# Patient Record
Sex: MALE | Race: WHITE | NOT HISPANIC OR LATINO | Employment: UNEMPLOYED | URBAN - METROPOLITAN AREA
[De-identification: names, ages, dates, MRNs, and addresses within clinical notes are randomized per-mention and may not be internally consistent; named-entity substitution may affect disease eponyms.]

---

## 2017-07-17 DIAGNOSIS — M54.10 RADICULOPATHY, SITE UNSPECIFIED: ICD-10-CM

## 2017-07-17 DIAGNOSIS — M54.2 CERVICALGIA: Primary | ICD-10-CM

## 2017-07-19 ENCOUNTER — HOSPITAL ENCOUNTER (OUTPATIENT)
Dept: RADIOLOGY | Facility: HOSPITAL | Age: 42
Discharge: HOME | End: 2017-07-19
Attending: RADIOLOGY
Payer: MEDICAID

## 2017-07-19 ENCOUNTER — HOSPITAL ENCOUNTER (OUTPATIENT)
Dept: RADIOLOGY | Facility: HOSPITAL | Age: 42
Discharge: HOME | End: 2017-07-19
Attending: NURSE PRACTITIONER
Payer: MEDICAID

## 2017-07-19 DIAGNOSIS — M54.2 CERVICALGIA: ICD-10-CM

## 2017-07-19 DIAGNOSIS — M54.10 RADICULOPATHY, SITE UNSPECIFIED: ICD-10-CM

## 2017-07-19 PROCEDURE — 72141 MRI NECK SPINE W/O DYE: CPT | Mod: TC

## 2017-07-19 PROCEDURE — 72141 MRI NECK SPINE W/O DYE: CPT | Mod: 26,,, | Performed by: RADIOLOGY

## 2017-07-19 PROCEDURE — 70140 X-RAY EXAM OF FACIAL BONES: CPT | Mod: TC

## 2017-07-19 PROCEDURE — 70140 X-RAY EXAM OF FACIAL BONES: CPT | Mod: 26,,, | Performed by: RADIOLOGY

## 2017-10-20 ENCOUNTER — HOSPITAL ENCOUNTER (EMERGENCY)
Facility: HOSPITAL | Age: 42
Discharge: HOME | End: 2017-10-20
Attending: EMERGENCY MEDICINE
Payer: MEDICAID

## 2017-10-20 VITALS
RESPIRATION RATE: 16 BRPM | TEMPERATURE: 98 F | HEART RATE: 76 BPM | OXYGEN SATURATION: 95 % | WEIGHT: 220 LBS | BODY MASS INDEX: 29.03 KG/M2 | DIASTOLIC BLOOD PRESSURE: 71 MMHG | SYSTOLIC BLOOD PRESSURE: 125 MMHG

## 2017-10-20 DIAGNOSIS — S81.012A LACERATION OF LEFT KNEE, INITIAL ENCOUNTER: Primary | ICD-10-CM

## 2017-10-20 PROCEDURE — 99283 EMERGENCY DEPT VISIT LOW MDM: CPT | Mod: 25

## 2017-10-20 PROCEDURE — 90471 IMMUNIZATION ADMIN: CPT | Performed by: EMERGENCY MEDICINE

## 2017-10-20 PROCEDURE — 12032 INTMD RPR S/A/T/EXT 2.6-7.5: CPT | Mod: LT

## 2017-10-20 PROCEDURE — 63600175 PHARM REV CODE 636 W HCPCS: Performed by: EMERGENCY MEDICINE

## 2017-10-20 PROCEDURE — 25000003 PHARM REV CODE 250: Performed by: EMERGENCY MEDICINE

## 2017-10-20 PROCEDURE — 90715 TDAP VACCINE 7 YRS/> IM: CPT | Performed by: EMERGENCY MEDICINE

## 2017-10-20 RX ORDER — CEPHALEXIN 250 MG/1
500 CAPSULE ORAL
Status: COMPLETED | OUTPATIENT
Start: 2017-10-20 | End: 2017-10-20

## 2017-10-20 RX ORDER — LIDOCAINE HCL/EPINEPHRINE/PF 2%-1:200K
10 VIAL (ML) INJECTION ONCE
Status: COMPLETED | OUTPATIENT
Start: 2017-10-20 | End: 2017-10-20

## 2017-10-20 RX ORDER — CEPHALEXIN 500 MG/1
500 CAPSULE ORAL EVERY 8 HOURS
Qty: 21 CAPSULE | Refills: 0 | Status: SHIPPED | OUTPATIENT
Start: 2017-10-20 | End: 2017-10-27

## 2017-10-20 RX ORDER — DICLOFENAC SODIUM 50 MG/1
50 TABLET, DELAYED RELEASE ORAL 3 TIMES DAILY
Qty: 15 TABLET | Refills: 0 | Status: SHIPPED | OUTPATIENT
Start: 2017-10-20 | End: 2018-10-20

## 2017-10-20 RX ADMIN — CEPHALEXIN 500 MG: 250 CAPSULE ORAL at 01:10

## 2017-10-20 RX ADMIN — CLOSTRIDIUM TETANI TOXOID ANTIGEN (FORMALDEHYDE INACTIVATED), CORYNEBACTERIUM DIPHTHERIAE TOXOID ANTIGEN (FORMALDEHYDE INACTIVATED), BORDETELLA PERTUSSIS TOXOID ANTIGEN (GLUTARALDEHYDE INACTIVATED), BORDETELLA PERTUSSIS FILAMENTOUS HEMAGGLUTININ ANTIGEN (FORMALDEHYDE INACTIVATED), BORDETELLA PERTUSSIS PERTACTIN ANTIGEN, AND BORDETELLA PERTUSSIS FIMBRIAE 2/3 ANTIGEN 0.5 ML: 5; 2; 2.5; 5; 3; 5 INJECTION, SUSPENSION INTRAMUSCULAR at 12:10

## 2017-10-20 RX ADMIN — LIDOCAINE HYDROCHLORIDE,EPINEPHRINE BITARTRATE 10 ML: 20; .005 INJECTION, SOLUTION EPIDURAL; INFILTRATION; INTRACAUDAL; PERINEURAL at 12:10

## 2017-10-20 NOTE — ED PROVIDER NOTES
Encounter Date: 10/20/2017    SCRIBE #1 NOTE: I, Renuka Marks, am scribing for, and in the presence of, Dr. Lott.       History     Chief Complaint   Patient presents with    Laceration      wheel disintergrated and hit left leg       10/20/2017 12:48 PM     Chief complaint: Left Knee Laceration      Tomy Hylton is a 42 y.o. male who presents to the ED with a c/o laceration to the left knee with associated pain, shortly PTA. 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 Denies fever. NKDA. His tetanus immunization is not UTD. No pertinent medical or surgical history.      The history is provided by the patient.     Review of patient's allergies indicates:  No Known Allergies  History reviewed. No pertinent past medical history.  Past Surgical History:   Procedure Laterality Date    HERNIA REPAIR       History reviewed. No pertinent family history.  Social History   Substance Use Topics    Smoking status: Current Every Day Smoker     Packs/day: 0.50    Smokeless tobacco: Never Used    Alcohol use No     Review of Systems   Constitutional: Negative for activity change, appetite change, chills, fatigue and fever.   Eyes: Negative for visual disturbance.   Respiratory: Negative for apnea and shortness of breath.    Cardiovascular: Negative for chest pain and palpitations.   Gastrointestinal: Negative for abdominal distention and abdominal pain.   Genitourinary: Negative for difficulty urinating.   Musculoskeletal: Negative for neck pain.   Skin: Positive for wound (Laceration to left knee.). Negative for pallor and rash.   Neurological: Negative for headaches.   Hematological: Does not bruise/bleed easily.   Psychiatric/Behavioral: Negative for agitation.       Physical Exam     Initial Vitals [10/20/17 1242]   BP Pulse Resp Temp SpO2   125/71 76 16 98.4 °F (36.9 °C) 95 %      MAP       89         Physical Exam    Nursing note and vitals  reviewed.  Constitutional: He appears well-developed and well-nourished.   HENT:   Head: Normocephalic and atraumatic.   Eyes: Conjunctivae are normal.   Neck: Normal range of motion. Neck supple.   Cardiovascular: Normal rate, regular rhythm and normal heart sounds. Exam reveals no gallop and no friction rub.    No murmur heard.  Pulmonary/Chest: Breath sounds normal. No respiratory distress. He has no wheezes. He has no rhonchi. He has no rales.   Abdominal: Soft. He exhibits no distension. There is no tenderness. There is no rebound.   Musculoskeletal: Normal range of motion.   5 cm horizontal laceration of the left knee with small amounts of foreign bodies. No effusion. Does not enter the joint capsule.    Neurological: He is alert and oriented to person, place, and time.   Skin: Skin is warm and dry.   Psychiatric: He has a normal mood and affect.         ED Course   Lac Repair  Date/Time: 10/20/2017 1:31 PM  Performed by: KARLA CHA III  Authorized by: KARLA CHA III   Body area: lower extremity  Laceration length: 5 cm  Contamination: The wound is contaminated.  Tendon involvement: none  Nerve involvement: none  Vascular damage: no  Anesthesia: local infiltration    Anesthesia:  Local Anesthetic: lidocaine 2% with epinephrine  Anesthetic total: 5 mL  Preparation: Patient was prepped and draped in the usual sterile fashion.  Irrigation method: jet lavage  Amount of cleaning: extensive  Debridement: none  Degree of undermining: none  Skin closure: staples  Technique: simple  Approximation: close  Approximation difficulty: simple  Patient tolerance: Patient tolerated the procedure well with no immediate complications        Labs Reviewed - No data to display          Medical Decision Making:   History:   Old Medical Records: I decided to obtain old medical records.  ED Management:  Tomy Hylton is a 42 y.o. male who presents with  left knee laceration with no evidence of tendon or nerve  involvement.  The wound is extensively irrigated and closed with staples.  He is empirically treated with Keflex and encouraged to return immediately for swelling, erythema or drainage.  Staples are to be removed in 10 days.            Scribe Attestation:   Scribe #1: I performed the above scribed service and the documentation accurately describes the services I performed. I attest to the accuracy of the note.            ED Course      Clinical Impression:   No diagnosis found.                             Yonis Lott III, MD  10/20/17 9930

## 2017-11-07 ENCOUNTER — HOSPITAL ENCOUNTER (EMERGENCY)
Facility: HOSPITAL | Age: 42
Discharge: HOME | End: 2017-11-07
Attending: EMERGENCY MEDICINE
Payer: MEDICAID

## 2017-11-07 VITALS
HEIGHT: 73 IN | HEART RATE: 87 BPM | BODY MASS INDEX: 29.16 KG/M2 | RESPIRATION RATE: 16 BRPM | DIASTOLIC BLOOD PRESSURE: 74 MMHG | OXYGEN SATURATION: 98 % | TEMPERATURE: 97 F | SYSTOLIC BLOOD PRESSURE: 160 MMHG | WEIGHT: 220 LBS

## 2017-11-07 DIAGNOSIS — Z51.89 ENCOUNTER FOR POST-TRAUMATIC WOUND CHECK: Primary | ICD-10-CM

## 2017-11-07 PROCEDURE — 99281 EMR DPT VST MAYX REQ PHY/QHP: CPT

## 2017-11-07 NOTE — ED NOTES
Pt was seen here 10 days ago for laceration to left knee. 7 staples in place. Wound appears to be well healed. No redness or drainage noted. 7 staples removed. Tolerated well.

## 2017-11-07 NOTE — ED PROVIDER NOTES
Encounter Date: 11/7/2017    SCRIBE #1 NOTE: Taylor MIRANDA, juanita scribing for, and in the presence of, Helena Savage NP.       History     Chief Complaint   Patient presents with    Suture / Staple Removal     left knee       11/07/2017 2:10 PM     Chief complaint:       Tomy Hylton is a 42 y.o. male who presents to the ED for follow up for a left knee laceration. He was seen at [facility] on 10/20/2017 where he received 7 staples to his left knee. The patient denies pain, fever, and drainage to the site. He was treated empirically with keflex, which he completed. His Tetanus shot was updated on 10/20/2017.       The history is provided by the patient.     Review of patient's allergies indicates:  No Known Allergies  No past medical history on file.  Past Surgical History:   Procedure Laterality Date    HERNIA REPAIR       History reviewed. No pertinent family history.  Social History   Substance Use Topics    Smoking status: Current Every Day Smoker     Packs/day: 0.50    Smokeless tobacco: Never Used    Alcohol use No     Review of Systems   Constitutional: Negative for chills and fever.   Musculoskeletal: Negative for joint swelling.   Skin: Positive for wound ( left knee).       Physical Exam     Initial Vitals [11/07/17 1408]   BP Pulse Resp Temp SpO2   (!) 160/74 87 16 97.4 °F (36.3 °C) 98 %      MAP       102.67         Physical Exam    Nursing note and vitals reviewed.  Constitutional: Vital signs are normal. He appears well-developed and well-nourished.   HENT:   Head: Normocephalic and atraumatic.   Eyes: Pupils are equal, round, and reactive to light.   Neck: Neck supple.   Cardiovascular: Normal rate, regular rhythm, normal heart sounds and intact distal pulses.   No murmur heard.  Pulmonary/Chest: Breath sounds normal.   Abdominal: Normal appearance.   Musculoskeletal: Normal range of motion. He exhibits no edema or tenderness.   Normal left knee ROM   Neurological: He is alert and  oriented to person, place, and time.   Skin: Skin is warm, dry and intact.   Incision to the left knee approximately 5cm. Well approximated. No draining, erythema, or swelling.   Psychiatric: He has a normal mood and affect. His speech is normal and behavior is normal.         ED Course   Procedures  Labs Reviewed - No data to display          Medical Decision Making:   History:   Old Medical Records: I decided to obtain old medical records.  Differential Diagnosis:   Cellulitis  Wound infection/dehissince        APC / Resident Notes:   Patient is a 42 y.o. male who presents to the ED 11/07/2017 with a chief complaint of wound check. Patient had laceration repair to left knee with staples on 10/20/2017. Patient completed course of keflex. He is afebrile and well appearing. His left knee is not swollen or tender without erythema; 5 cm horizontal laceration well approximated without drainage; staples removed; patient tolerated well. No signs of infection; patient has normal ROM of left knee and is ambulatory; do not feel patient has acute infection; no further imaging or labs needed at this time; patient discharged home.             Scribe Attestation:   Scribe #1: I performed the above scribed service and the documentation accurately describes the services I performed. I attest to the accuracy of the note.    Attending Attestation:     Physician Attestation Statement for NP/PA:   I discussed this assessment and plan of this patient with the NP/PA, but I did not personally examine the patient. The face to face encounter was performed by the NP/PA.    Other NP/PA Attestation Additions:    History of Present Illness: 42-year-old male presented for staple removal.    Medical Decision Making: Initial differential diagnosis included but not limited to healing wound and infected wound.  I am in agreement with the nurse practitioner's assessment, treatment, and plan of care.       Physician Attestation for Scribe:  Physician  Attestation Statement for Scribe #1: I, Helena Savage, reviewed documentation, as scribed by in my presence, and it is both accurate and complete.     Comments: I, JENNIFER Han, personally performed the services described in this documentation. All medical record entries made by the scribe were at my direction and in my presence.  I have reviewed the chart and agree that the record reflects my personal performance and is accurate and complete. JENNIFER Han.  11:16 PM 11/07/2017             ED Course      Clinical Impression:   The encounter diagnosis was Encounter for post-traumatic wound check.    Disposition:   Disposition: Discharged  Condition: Stable                        Helena Savage NP  11/07/17 3622       Perez Glover MD  11/10/17 0822

## 2017-11-07 NOTE — ED PROVIDER NOTES
Encounter Date: 11/7/2017       History     Chief Complaint   Patient presents with    Suture / Staple Removal     left knee     HPI  Review of patient's allergies indicates:  No Known Allergies  No past medical history on file.  Past Surgical History:   Procedure Laterality Date    HERNIA REPAIR       History reviewed. No pertinent family history.  Social History   Substance Use Topics    Smoking status: Current Every Day Smoker     Packs/day: 0.50    Smokeless tobacco: Never Used    Alcohol use No     Review of Systems    Physical Exam     Initial Vitals [11/07/17 1408]   BP Pulse Resp Temp SpO2   (!) 160/74 87 16 97.4 °F (36.3 °C) 98 %      MAP       102.67         Physical Exam    ED Course   Procedures  Labs Reviewed - No data to display                            ED Course      Clinical Impression:   {Add your Clinical Impression here. If you haven't documented one yet, please pend the note, finalize a Clinical Impression, and refresh your note before signing.:[id number]}

## 2019-12-13 ENCOUNTER — HOSPITAL ENCOUNTER (EMERGENCY)
Facility: HOSPITAL | Age: 44
Discharge: HOME | End: 2019-12-13
Attending: EMERGENCY MEDICINE
Payer: MEDICAID

## 2019-12-13 VITALS
OXYGEN SATURATION: 95 % | RESPIRATION RATE: 18 BRPM | BODY MASS INDEX: 30.39 KG/M2 | HEART RATE: 58 BPM | SYSTOLIC BLOOD PRESSURE: 114 MMHG | TEMPERATURE: 98 F | WEIGHT: 236.81 LBS | HEIGHT: 74 IN | DIASTOLIC BLOOD PRESSURE: 56 MMHG

## 2019-12-13 DIAGNOSIS — K52.9 GASTROENTERITIS: ICD-10-CM

## 2019-12-13 DIAGNOSIS — R10.9 ABDOMINAL PAIN, UNSPECIFIED ABDOMINAL LOCATION: Primary | ICD-10-CM

## 2019-12-13 LAB
ALBUMIN SERPL BCP-MCNC: 4.5 G/DL (ref 3.5–5.2)
ALP SERPL-CCNC: 74 U/L (ref 55–135)
ALT SERPL W/O P-5'-P-CCNC: 57 U/L (ref 10–44)
ANION GAP SERPL CALC-SCNC: 8 MMOL/L (ref 8–16)
AST SERPL-CCNC: 26 U/L (ref 10–40)
BASOPHILS # BLD AUTO: 0.12 K/UL (ref 0–0.2)
BASOPHILS NFR BLD: 1.3 % (ref 0–1.9)
BILIRUB SERPL-MCNC: 1.6 MG/DL (ref 0.1–1)
BILIRUB UR QL STRIP: NEGATIVE
BUN SERPL-MCNC: 14 MG/DL (ref 6–20)
CALCIUM SERPL-MCNC: 9.5 MG/DL (ref 8.7–10.5)
CHLORIDE SERPL-SCNC: 103 MMOL/L (ref 95–110)
CLARITY UR: CLEAR
CO2 SERPL-SCNC: 27 MMOL/L (ref 23–29)
COLOR UR: YELLOW
CREAT SERPL-MCNC: 1 MG/DL (ref 0.5–1.4)
DIFFERENTIAL METHOD: ABNORMAL
EOSINOPHIL # BLD AUTO: 0.2 K/UL (ref 0–0.5)
EOSINOPHIL NFR BLD: 2.1 % (ref 0–8)
ERYTHROCYTE [DISTWIDTH] IN BLOOD BY AUTOMATED COUNT: 13.4 % (ref 11.5–14.5)
EST. GFR  (AFRICAN AMERICAN): >60 ML/MIN/1.73 M^2
EST. GFR  (NON AFRICAN AMERICAN): >60 ML/MIN/1.73 M^2
GLUCOSE SERPL-MCNC: 109 MG/DL (ref 70–110)
GLUCOSE UR QL STRIP: NEGATIVE
HCT VFR BLD AUTO: 51.9 % (ref 40–54)
HGB BLD-MCNC: 17.2 G/DL (ref 14–18)
HGB UR QL STRIP: NEGATIVE
IMM GRANULOCYTES # BLD AUTO: 0.1 K/UL (ref 0–0.04)
IMM GRANULOCYTES NFR BLD AUTO: 1.1 % (ref 0–0.5)
KETONES UR QL STRIP: NEGATIVE
LEUKOCYTE ESTERASE UR QL STRIP: NEGATIVE
LIPASE SERPL-CCNC: 28 U/L (ref 4–60)
LYMPHOCYTES # BLD AUTO: 2.2 K/UL (ref 1–4.8)
LYMPHOCYTES NFR BLD: 22.7 % (ref 18–48)
MCH RBC QN AUTO: 30.2 PG (ref 27–31)
MCHC RBC AUTO-ENTMCNC: 33.1 G/DL (ref 32–36)
MCV RBC AUTO: 91 FL (ref 82–98)
MONOCYTES # BLD AUTO: 1.1 K/UL (ref 0.3–1)
MONOCYTES NFR BLD: 11.1 % (ref 4–15)
NEUTROPHILS # BLD AUTO: 5.9 K/UL (ref 1.8–7.7)
NEUTROPHILS NFR BLD: 61.7 % (ref 38–73)
NITRITE UR QL STRIP: NEGATIVE
NRBC BLD-RTO: 0 /100 WBC
PH UR STRIP: 7 [PH] (ref 5–8)
PLATELET # BLD AUTO: 249 K/UL (ref 150–350)
PMV BLD AUTO: 10.5 FL (ref 9.2–12.9)
POTASSIUM SERPL-SCNC: 4.4 MMOL/L (ref 3.5–5.1)
PROT SERPL-MCNC: 7.6 G/DL (ref 6–8.4)
PROT UR QL STRIP: ABNORMAL
RBC # BLD AUTO: 5.7 M/UL (ref 4.6–6.2)
SODIUM SERPL-SCNC: 138 MMOL/L (ref 136–145)
SP GR UR STRIP: >=1.03 (ref 1–1.03)
URN SPEC COLLECT METH UR: ABNORMAL
UROBILINOGEN UR STRIP-ACNC: NEGATIVE EU/DL
WBC # BLD AUTO: 9.51 K/UL (ref 3.9–12.7)

## 2019-12-13 PROCEDURE — 25000003 PHARM REV CODE 250: Performed by: EMERGENCY MEDICINE

## 2019-12-13 PROCEDURE — 96365 THER/PROPH/DIAG IV INF INIT: CPT | Mod: 59

## 2019-12-13 PROCEDURE — 85025 COMPLETE CBC W/AUTO DIFF WBC: CPT

## 2019-12-13 PROCEDURE — 83690 ASSAY OF LIPASE: CPT

## 2019-12-13 PROCEDURE — C9113 INJ PANTOPRAZOLE SODIUM, VIA: HCPCS | Performed by: EMERGENCY MEDICINE

## 2019-12-13 PROCEDURE — 63600175 PHARM REV CODE 636 W HCPCS: Performed by: EMERGENCY MEDICINE

## 2019-12-13 PROCEDURE — 81003 URINALYSIS AUTO W/O SCOPE: CPT

## 2019-12-13 PROCEDURE — 96375 TX/PRO/DX INJ NEW DRUG ADDON: CPT

## 2019-12-13 PROCEDURE — 80053 COMPREHEN METABOLIC PANEL: CPT

## 2019-12-13 PROCEDURE — 99285 EMERGENCY DEPT VISIT HI MDM: CPT | Mod: 25

## 2019-12-13 PROCEDURE — 25500020 PHARM REV CODE 255: Performed by: EMERGENCY MEDICINE

## 2019-12-13 RX ORDER — HYOSCYAMINE SULFATE 0.125 MG
125 TABLET ORAL EVERY 4 HOURS PRN
Qty: 30 TABLET | Refills: 0 | Status: SHIPPED | OUTPATIENT
Start: 2019-12-13 | End: 2020-01-12

## 2019-12-13 RX ORDER — HYOSCYAMINE SULFATE 0.12 MG/1
0.12 TABLET SUBLINGUAL
Status: COMPLETED | OUTPATIENT
Start: 2019-12-13 | End: 2019-12-13

## 2019-12-13 RX ORDER — PANTOPRAZOLE SODIUM 40 MG/10ML
40 INJECTION, POWDER, LYOPHILIZED, FOR SOLUTION INTRAVENOUS
Status: COMPLETED | OUTPATIENT
Start: 2019-12-13 | End: 2019-12-13

## 2019-12-13 RX ORDER — ONDANSETRON 4 MG/1
4 TABLET, FILM COATED ORAL EVERY 6 HOURS PRN
Qty: 20 TABLET | Refills: 1 | Status: SHIPPED | OUTPATIENT
Start: 2019-12-13 | End: 2020-12-12

## 2019-12-13 RX ORDER — ACETAMINOPHEN 10 MG/ML
1000 INJECTION, SOLUTION INTRAVENOUS
Status: COMPLETED | OUTPATIENT
Start: 2019-12-13 | End: 2019-12-13

## 2019-12-13 RX ORDER — FAMOTIDINE 20 MG/1
20 TABLET, FILM COATED ORAL 2 TIMES DAILY
Qty: 20 TABLET | Refills: 0 | Status: SHIPPED | OUTPATIENT
Start: 2019-12-13 | End: 2020-12-12

## 2019-12-13 RX ORDER — ONDANSETRON 2 MG/ML
8 INJECTION INTRAMUSCULAR; INTRAVENOUS
Status: COMPLETED | OUTPATIENT
Start: 2019-12-13 | End: 2019-12-13

## 2019-12-13 RX ADMIN — ACETAMINOPHEN 1000 MG: 10 INJECTION, SOLUTION INTRAVENOUS at 01:12

## 2019-12-13 RX ADMIN — IOHEXOL 100 ML: 350 INJECTION, SOLUTION INTRAVENOUS at 01:12

## 2019-12-13 RX ADMIN — ONDANSETRON 8 MG: 2 INJECTION INTRAMUSCULAR; INTRAVENOUS at 01:12

## 2019-12-13 RX ADMIN — HYOSCYAMINE SULFATE 0.12 MG: 0.12 TABLET ORAL; SUBLINGUAL at 01:12

## 2019-12-13 RX ADMIN — SODIUM CHLORIDE, POTASSIUM CHLORIDE, SODIUM LACTATE AND CALCIUM CHLORIDE 1000 ML: 600; 310; 30; 20 INJECTION, SOLUTION INTRAVENOUS at 01:12

## 2019-12-13 RX ADMIN — PANTOPRAZOLE SODIUM 40 MG: 40 INJECTION, POWDER, FOR SOLUTION INTRAVENOUS at 01:12

## 2019-12-13 NOTE — ED PROVIDER NOTES
Encounter Date: 12/13/2019       History     Chief Complaint   Patient presents with    Abdominal Pain     2 x days, vomiting today     44-year-old male presented emergency department with 2 days of diffuse abdominal pain and cramps predominantly in the lower abdomen.  Patient also has nausea and vomiting and diarrhea.  Denies fever or chills or dysuria or hematuria weakness or numbness        Review of patient's allergies indicates:  No Known Allergies  No past medical history on file.  Past Surgical History:   Procedure Laterality Date    HERNIA REPAIR       No family history on file.  Social History     Tobacco Use    Smoking status: Current Every Day Smoker     Packs/day: 0.50    Smokeless tobacco: Never Used   Substance Use Topics    Alcohol use: No    Drug use: No     Review of Systems   Constitutional: Negative.    HENT: Negative.    Eyes: Negative.    Respiratory: Negative.    Cardiovascular: Negative.    Gastrointestinal: Positive for abdominal pain, diarrhea, nausea and vomiting. Negative for blood in stool, constipation and rectal pain.   Endocrine: Negative.    Genitourinary: Negative.    Musculoskeletal: Negative.    Skin: Negative.    Allergic/Immunologic: Negative.    Neurological: Negative.    Hematological: Negative.    Psychiatric/Behavioral: Negative for agitation.   All other systems reviewed and are negative.      Physical Exam     Initial Vitals [12/13/19 1137]   BP Pulse Resp Temp SpO2   (!) 152/99 62 18 98.4 °F (36.9 °C) --      MAP       --         Physical Exam    Nursing note and vitals reviewed.  Constitutional: He appears well-developed and well-nourished.   HENT:   Head: Normocephalic and atraumatic.   Nose: Nose normal.   Eyes: Conjunctivae and EOM are normal.   Neck: Normal range of motion. No tracheal deviation present.   Cardiovascular: Normal rate, regular rhythm, normal heart sounds and intact distal pulses. Exam reveals no friction rub.    No murmur heard.  Pulmonary/Chest:  Breath sounds normal. No respiratory distress. He has no wheezes. He has no rales.   Abdominal: Soft. He exhibits no distension. There is tenderness.   Diffuse mild lower abdomen  tenderness   Musculoskeletal: Normal range of motion.   Neurological: He is alert and oriented to person, place, and time. He has normal strength.   Skin: Skin is warm and dry. Capillary refill takes less than 2 seconds.   Psychiatric: He has a normal mood and affect. Thought content normal.         ED Course   Procedures  Labs Reviewed   CBC W/ AUTO DIFFERENTIAL - Abnormal; Notable for the following components:       Result Value    Immature Granulocytes 1.1 (*)     Immature Grans (Abs) 0.10 (*)     Mono # 1.1 (*)     All other components within normal limits    Narrative:     For upper or mid abdominal pain.   COMPREHENSIVE METABOLIC PANEL - Abnormal; Notable for the following components:    Total Bilirubin 1.6 (*)     ALT 57 (*)     All other components within normal limits    Narrative:     For upper or mid abdominal pain.   LIPASE    Narrative:     For upper or mid abdominal pain.   URINALYSIS   URINALYSIS, REFLEX TO URINE CULTURE          Imaging Results    None          Medical Decision Making:   Differential Diagnosis:   44-year-old male presented emergency department with abdominal pain and cramps and nausea and diarrhea and presentation consistent with gastroenteritis.  Patient felt better with IV fluids and treatment of pain. As patient feeling better and CT scan did not show any acute findings discharged with instructions and follow-up.  Repeat abdominal exam does not show any abdominal tenderness.  Patient feeling well and discharged with instructions and follow-up  Clinical Tests:   Lab Tests: Reviewed  Radiological Study: Reviewed                                 Clinical Impression:       ICD-10-CM ICD-9-CM   1. Abdominal pain, unspecified abdominal location R10.9 789.00   2. Gastroenteritis K52.9 558.9                              Dave Soto MD  12/13/19 4851

## 2021-11-20 ENCOUNTER — HOSPITAL ENCOUNTER (OUTPATIENT)
Facility: HOSPITAL | Age: 46
Discharge: HOME | End: 2021-11-22
Attending: EMERGENCY MEDICINE | Admitting: EMERGENCY MEDICINE
Payer: MEDICAID

## 2021-11-20 DIAGNOSIS — I25.10 ASCVD (ARTERIOSCLEROTIC CARDIOVASCULAR DISEASE): ICD-10-CM

## 2021-11-20 DIAGNOSIS — R07.9 CHEST PAIN: Primary | ICD-10-CM

## 2021-11-20 LAB
ALBUMIN SERPL BCP-MCNC: 4 G/DL (ref 3.5–5.2)
ALP SERPL-CCNC: 94 U/L (ref 55–135)
ALT SERPL W/O P-5'-P-CCNC: 74 U/L (ref 10–44)
ANION GAP SERPL CALC-SCNC: 9 MMOL/L (ref 8–16)
AST SERPL-CCNC: 36 U/L (ref 10–40)
BASOPHILS # BLD AUTO: 0.18 K/UL (ref 0–0.2)
BASOPHILS NFR BLD: 1.6 % (ref 0–1.9)
BILIRUB SERPL-MCNC: 1 MG/DL (ref 0.1–1)
BUN SERPL-MCNC: 11 MG/DL (ref 6–20)
CALCIUM SERPL-MCNC: 9.8 MG/DL (ref 8.7–10.5)
CHLORIDE SERPL-SCNC: 105 MMOL/L (ref 95–110)
CO2 SERPL-SCNC: 26 MMOL/L (ref 23–29)
CREAT SERPL-MCNC: 0.8 MG/DL (ref 0.5–1.4)
DIFFERENTIAL METHOD: ABNORMAL
EOSINOPHIL # BLD AUTO: 0.3 K/UL (ref 0–0.5)
EOSINOPHIL NFR BLD: 2.5 % (ref 0–8)
ERYTHROCYTE [DISTWIDTH] IN BLOOD BY AUTOMATED COUNT: 13.3 % (ref 11.5–14.5)
EST. GFR  (AFRICAN AMERICAN): >60 ML/MIN/1.73 M^2
EST. GFR  (NON AFRICAN AMERICAN): >60 ML/MIN/1.73 M^2
GLUCOSE SERPL-MCNC: 133 MG/DL (ref 70–110)
HCT VFR BLD AUTO: 46.5 % (ref 40–54)
HGB BLD-MCNC: 15.8 G/DL (ref 14–18)
IMM GRANULOCYTES # BLD AUTO: 0.4 K/UL (ref 0–0.04)
IMM GRANULOCYTES NFR BLD AUTO: 3.6 % (ref 0–0.5)
LYMPHOCYTES # BLD AUTO: 2.1 K/UL (ref 1–4.8)
LYMPHOCYTES NFR BLD: 18.9 % (ref 18–48)
MCH RBC QN AUTO: 31.2 PG (ref 27–31)
MCHC RBC AUTO-ENTMCNC: 34 G/DL (ref 32–36)
MCV RBC AUTO: 92 FL (ref 82–98)
MONOCYTES # BLD AUTO: 1 K/UL (ref 0.3–1)
MONOCYTES NFR BLD: 9.5 % (ref 4–15)
NEUTROPHILS # BLD AUTO: 7 K/UL (ref 1.8–7.7)
NEUTROPHILS NFR BLD: 63.9 % (ref 38–73)
NRBC BLD-RTO: 0 /100 WBC
PLATELET # BLD AUTO: 228 K/UL (ref 150–450)
PMV BLD AUTO: 10.9 FL (ref 9.2–12.9)
POTASSIUM SERPL-SCNC: 3.7 MMOL/L (ref 3.5–5.1)
PROT SERPL-MCNC: 7.5 G/DL (ref 6–8.4)
RBC # BLD AUTO: 5.07 M/UL (ref 4.6–6.2)
SODIUM SERPL-SCNC: 140 MMOL/L (ref 136–145)
TROPONIN I SERPL DL<=0.01 NG/ML-MCNC: 0.05 NG/ML (ref 0–0.03)
TROPONIN I SERPL DL<=0.01 NG/ML-MCNC: <0.006 NG/ML (ref 0–0.03)
WBC # BLD AUTO: 10.99 K/UL (ref 3.9–12.7)

## 2021-11-20 PROCEDURE — 83036 HEMOGLOBIN GLYCOSYLATED A1C: CPT | Performed by: PHYSICIAN ASSISTANT

## 2021-11-20 PROCEDURE — 99285 EMERGENCY DEPT VISIT HI MDM: CPT | Mod: 25

## 2021-11-20 PROCEDURE — 93005 ELECTROCARDIOGRAM TRACING: CPT

## 2021-11-20 PROCEDURE — 84484 ASSAY OF TROPONIN QUANT: CPT | Performed by: PHYSICIAN ASSISTANT

## 2021-11-20 PROCEDURE — 80061 LIPID PANEL: CPT | Performed by: PHYSICIAN ASSISTANT

## 2021-11-20 PROCEDURE — 36000 PLACE NEEDLE IN VEIN: CPT

## 2021-11-20 PROCEDURE — 80307 DRUG TEST PRSMV CHEM ANLYZR: CPT | Performed by: PHYSICIAN ASSISTANT

## 2021-11-20 PROCEDURE — 80053 COMPREHEN METABOLIC PANEL: CPT | Performed by: PHYSICIAN ASSISTANT

## 2021-11-20 PROCEDURE — 36415 COLL VENOUS BLD VENIPUNCTURE: CPT | Performed by: PHYSICIAN ASSISTANT

## 2021-11-20 PROCEDURE — 85025 COMPLETE CBC W/AUTO DIFF WBC: CPT | Performed by: PHYSICIAN ASSISTANT

## 2021-11-20 RX ORDER — ASPIRIN 325 MG
325 TABLET ORAL DAILY
Status: DISCONTINUED | OUTPATIENT
Start: 2021-11-20 | End: 2021-11-20

## 2021-11-20 RX ORDER — ASPIRIN 81 MG/1
81 TABLET ORAL DAILY
Status: DISCONTINUED | OUTPATIENT
Start: 2021-11-21 | End: 2021-11-22 | Stop reason: HOSPADM

## 2021-11-20 RX ORDER — ASPIRIN 325 MG
325 TABLET ORAL ONCE
Status: COMPLETED | OUTPATIENT
Start: 2021-11-21 | End: 2021-11-20

## 2021-11-20 RX ADMIN — ASPIRIN 325 MG ORAL TABLET 325 MG: 325 PILL ORAL at 11:11

## 2021-11-21 PROBLEM — E78.2 MIXED HYPERLIPIDEMIA: Status: ACTIVE | Noted: 2021-11-21

## 2021-11-21 PROBLEM — Z72.0 TOBACCO ABUSE: Status: ACTIVE | Noted: 2021-11-21

## 2021-11-21 LAB
AMPHET+METHAMPHET UR QL: NEGATIVE
ANION GAP SERPL CALC-SCNC: 14 MMOL/L (ref 8–16)
BARBITURATES UR QL SCN>200 NG/ML: NEGATIVE
BASOPHILS # BLD AUTO: 0.18 K/UL (ref 0–0.2)
BASOPHILS NFR BLD: 1.5 % (ref 0–1.9)
BENZODIAZ UR QL SCN>200 NG/ML: NEGATIVE
BUN SERPL-MCNC: 10 MG/DL (ref 6–20)
BZE UR QL SCN: NEGATIVE
CALCIUM SERPL-MCNC: 10.1 MG/DL (ref 8.7–10.5)
CANNABINOIDS UR QL SCN: NEGATIVE
CHLORIDE SERPL-SCNC: 102 MMOL/L (ref 95–110)
CHOLEST SERPL-MCNC: 319 MG/DL (ref 120–199)
CHOLEST/HDLC SERPL: 7.8 {RATIO} (ref 2–5)
CO2 SERPL-SCNC: 24 MMOL/L (ref 23–29)
CREAT SERPL-MCNC: 0.8 MG/DL (ref 0.5–1.4)
CREAT UR-MCNC: 48.3 MG/DL (ref 23–375)
DIFFERENTIAL METHOD: ABNORMAL
EOSINOPHIL # BLD AUTO: 0.3 K/UL (ref 0–0.5)
EOSINOPHIL NFR BLD: 2.9 % (ref 0–8)
ERYTHROCYTE [DISTWIDTH] IN BLOOD BY AUTOMATED COUNT: 13.4 % (ref 11.5–14.5)
EST. GFR  (AFRICAN AMERICAN): >60 ML/MIN/1.73 M^2
EST. GFR  (NON AFRICAN AMERICAN): >60 ML/MIN/1.73 M^2
ESTIMATED AVG GLUCOSE: 123 MG/DL (ref 68–131)
GLUCOSE SERPL-MCNC: 113 MG/DL (ref 70–110)
HBA1C MFR BLD: 5.9 % (ref 4–5.6)
HCT VFR BLD AUTO: 48.5 % (ref 40–54)
HDLC SERPL-MCNC: 41 MG/DL (ref 40–75)
HDLC SERPL: 12.9 % (ref 20–50)
HGB BLD-MCNC: 16.1 G/DL (ref 14–18)
IMM GRANULOCYTES # BLD AUTO: 0.34 K/UL (ref 0–0.04)
IMM GRANULOCYTES NFR BLD AUTO: 2.9 % (ref 0–0.5)
LDLC SERPL CALC-MCNC: 209.2 MG/DL (ref 63–159)
LYMPHOCYTES # BLD AUTO: 3.7 K/UL (ref 1–4.8)
LYMPHOCYTES NFR BLD: 32 % (ref 18–48)
MCH RBC QN AUTO: 31 PG (ref 27–31)
MCHC RBC AUTO-ENTMCNC: 33.2 G/DL (ref 32–36)
MCV RBC AUTO: 93 FL (ref 82–98)
METHADONE UR QL SCN>300 NG/ML: NEGATIVE
MONOCYTES # BLD AUTO: 0.9 K/UL (ref 0.3–1)
MONOCYTES NFR BLD: 8 % (ref 4–15)
NEUTROPHILS # BLD AUTO: 6.1 K/UL (ref 1.8–7.7)
NEUTROPHILS NFR BLD: 52.7 % (ref 38–73)
NONHDLC SERPL-MCNC: 278 MG/DL
NRBC BLD-RTO: 0 /100 WBC
OPIATES UR QL SCN: NEGATIVE
PCP UR QL SCN>25 NG/ML: NEGATIVE
PLATELET # BLD AUTO: 233 K/UL (ref 150–450)
PMV BLD AUTO: 11.4 FL (ref 9.2–12.9)
POTASSIUM SERPL-SCNC: 3.8 MMOL/L (ref 3.5–5.1)
RBC # BLD AUTO: 5.19 M/UL (ref 4.6–6.2)
SARS-COV-2 RDRP RESP QL NAA+PROBE: NEGATIVE
SODIUM SERPL-SCNC: 140 MMOL/L (ref 136–145)
TOXICOLOGY INFORMATION: NORMAL
TRIGL SERPL-MCNC: 344 MG/DL (ref 30–150)
TROPONIN I SERPL DL<=0.01 NG/ML-MCNC: 0.03 NG/ML (ref 0–0.03)
TROPONIN I SERPL DL<=0.01 NG/ML-MCNC: 0.05 NG/ML (ref 0–0.03)
TROPONIN I SERPL DL<=0.01 NG/ML-MCNC: 0.09 NG/ML (ref 0–0.03)
WBC # BLD AUTO: 11.63 K/UL (ref 3.9–12.7)

## 2021-11-21 PROCEDURE — 85025 COMPLETE CBC W/AUTO DIFF WBC: CPT | Performed by: PHYSICIAN ASSISTANT

## 2021-11-21 PROCEDURE — 25000242 PHARM REV CODE 250 ALT 637 W/ HCPCS: Performed by: PHYSICIAN ASSISTANT

## 2021-11-21 PROCEDURE — A4216 STERILE WATER/SALINE, 10 ML: HCPCS | Performed by: PHYSICIAN ASSISTANT

## 2021-11-21 PROCEDURE — 25000003 PHARM REV CODE 250: Performed by: SPECIALIST

## 2021-11-21 PROCEDURE — 36415 COLL VENOUS BLD VENIPUNCTURE: CPT | Performed by: NURSE PRACTITIONER

## 2021-11-21 PROCEDURE — 99900035 HC TECH TIME PER 15 MIN (STAT)

## 2021-11-21 PROCEDURE — 94761 N-INVAS EAR/PLS OXIMETRY MLT: CPT

## 2021-11-21 PROCEDURE — 63600175 PHARM REV CODE 636 W HCPCS: Performed by: HOSPITALIST

## 2021-11-21 PROCEDURE — U0002 COVID-19 LAB TEST NON-CDC: HCPCS | Performed by: EMERGENCY MEDICINE

## 2021-11-21 PROCEDURE — 99214 OFFICE O/P EST MOD 30 MIN: CPT | Mod: ,,, | Performed by: SPECIALIST

## 2021-11-21 PROCEDURE — 63600175 PHARM REV CODE 636 W HCPCS: Performed by: PHYSICIAN ASSISTANT

## 2021-11-21 PROCEDURE — S4991 NICOTINE PATCH NONLEGEND: HCPCS | Performed by: PHYSICIAN ASSISTANT

## 2021-11-21 PROCEDURE — 94640 AIRWAY INHALATION TREATMENT: CPT

## 2021-11-21 PROCEDURE — 84484 ASSAY OF TROPONIN QUANT: CPT | Mod: 91 | Performed by: NURSE PRACTITIONER

## 2021-11-21 PROCEDURE — 36415 COLL VENOUS BLD VENIPUNCTURE: CPT | Performed by: PHYSICIAN ASSISTANT

## 2021-11-21 PROCEDURE — 99214 PR OFFICE/OUTPT VISIT, EST, LEVL IV, 30-39 MIN: ICD-10-PCS | Mod: ,,, | Performed by: SPECIALIST

## 2021-11-21 PROCEDURE — 93005 ELECTROCARDIOGRAM TRACING: CPT

## 2021-11-21 PROCEDURE — 96372 THER/PROPH/DIAG INJ SC/IM: CPT

## 2021-11-21 PROCEDURE — G0378 HOSPITAL OBSERVATION PER HR: HCPCS

## 2021-11-21 PROCEDURE — 25000003 PHARM REV CODE 250: Performed by: PHYSICIAN ASSISTANT

## 2021-11-21 PROCEDURE — 80048 BASIC METABOLIC PNL TOTAL CA: CPT | Performed by: PHYSICIAN ASSISTANT

## 2021-11-21 RX ORDER — EZETIMIBE 10 MG/1
10 TABLET ORAL NIGHTLY
Status: DISCONTINUED | OUTPATIENT
Start: 2021-11-21 | End: 2021-11-22 | Stop reason: HOSPADM

## 2021-11-21 RX ORDER — SODIUM CHLORIDE 0.9 % (FLUSH) 0.9 %
10 SYRINGE (ML) INJECTION EVERY 8 HOURS
Status: DISCONTINUED | OUTPATIENT
Start: 2021-11-21 | End: 2021-11-22 | Stop reason: HOSPADM

## 2021-11-21 RX ORDER — ATORVASTATIN CALCIUM 40 MG/1
40 TABLET, FILM COATED ORAL DAILY
Status: DISCONTINUED | OUTPATIENT
Start: 2021-11-21 | End: 2021-11-22 | Stop reason: HOSPADM

## 2021-11-21 RX ORDER — IBUPROFEN 200 MG
1 TABLET ORAL DAILY
Status: DISCONTINUED | OUTPATIENT
Start: 2021-11-21 | End: 2021-11-22 | Stop reason: HOSPADM

## 2021-11-21 RX ORDER — METHOCARBAMOL 500 MG/1
500 TABLET, FILM COATED ORAL 4 TIMES DAILY PRN
Status: DISCONTINUED | OUTPATIENT
Start: 2021-11-21 | End: 2021-11-22 | Stop reason: HOSPADM

## 2021-11-21 RX ORDER — ACETAMINOPHEN 325 MG/1
650 TABLET ORAL EVERY 4 HOURS PRN
Status: DISCONTINUED | OUTPATIENT
Start: 2021-11-21 | End: 2021-11-22 | Stop reason: HOSPADM

## 2021-11-21 RX ORDER — ENOXAPARIN SODIUM 150 MG/ML
1 INJECTION SUBCUTANEOUS
Status: DISCONTINUED | OUTPATIENT
Start: 2021-11-21 | End: 2021-11-22 | Stop reason: HOSPADM

## 2021-11-21 RX ORDER — ENOXAPARIN SODIUM 100 MG/ML
1 INJECTION SUBCUTANEOUS
Status: DISCONTINUED | OUTPATIENT
Start: 2021-11-21 | End: 2021-11-21 | Stop reason: SDUPTHER

## 2021-11-21 RX ORDER — TALC
6 POWDER (GRAM) TOPICAL NIGHTLY PRN
Status: DISCONTINUED | OUTPATIENT
Start: 2021-11-21 | End: 2021-11-22 | Stop reason: HOSPADM

## 2021-11-21 RX ORDER — AMOXICILLIN 250 MG
1 CAPSULE ORAL 2 TIMES DAILY PRN
Status: DISCONTINUED | OUTPATIENT
Start: 2021-11-21 | End: 2021-11-22 | Stop reason: HOSPADM

## 2021-11-21 RX ORDER — IPRATROPIUM BROMIDE AND ALBUTEROL SULFATE 2.5; .5 MG/3ML; MG/3ML
3 SOLUTION RESPIRATORY (INHALATION) EVERY 4 HOURS PRN
Status: DISCONTINUED | OUTPATIENT
Start: 2021-11-21 | End: 2021-11-22 | Stop reason: HOSPADM

## 2021-11-21 RX ADMIN — ATORVASTATIN CALCIUM 40 MG: 40 TABLET, FILM COATED ORAL at 12:11

## 2021-11-21 RX ADMIN — Medication 1 PATCH: at 01:11

## 2021-11-21 RX ADMIN — ASPIRIN 81 MG: 81 TABLET, COATED ORAL at 09:11

## 2021-11-21 RX ADMIN — Medication 10 ML: at 09:11

## 2021-11-21 RX ADMIN — EZETIMIBE 10 MG: 10 TABLET ORAL at 08:11

## 2021-11-21 RX ADMIN — ENOXAPARIN SODIUM 120 MG: 60 INJECTION SUBCUTANEOUS at 01:11

## 2021-11-21 RX ADMIN — IPRATROPIUM BROMIDE AND ALBUTEROL SULFATE 3 ML: 2.5; .5 SOLUTION RESPIRATORY (INHALATION) at 04:11

## 2021-11-21 RX ADMIN — Medication 10 ML: at 02:11

## 2021-11-21 RX ADMIN — Medication 1 PATCH: at 09:11

## 2021-11-21 RX ADMIN — ENOXAPARIN SODIUM 120 MG: 120 INJECTION SUBCUTANEOUS at 02:11

## 2021-11-22 VITALS
RESPIRATION RATE: 18 BRPM | HEIGHT: 74 IN | SYSTOLIC BLOOD PRESSURE: 115 MMHG | DIASTOLIC BLOOD PRESSURE: 73 MMHG | BODY MASS INDEX: 32.81 KG/M2 | HEART RATE: 73 BPM | WEIGHT: 255.63 LBS | OXYGEN SATURATION: 97 % | TEMPERATURE: 98 F

## 2021-11-22 LAB
CV PHARM DOSE: 0.4 MG
CV STRESS BASE HR: 75 BPM
DIASTOLIC BLOOD PRESSURE: 86 MMHG
OHS CV CPX 85 PERCENT MAX PREDICTED HEART RATE MALE: 146
OHS CV CPX MAX PREDICTED HEART RATE: 172
OHS CV CPX PATIENT IS FEMALE: 0
OHS CV CPX PATIENT IS MALE: 1
OHS CV CPX PEAK DIASTOLIC BLOOD PRESSURE: 85 MMHG
OHS CV CPX PEAK HEAR RATE: 139 BPM
OHS CV CPX PEAK RATE PRESSURE PRODUCT: NORMAL
OHS CV CPX PEAK SYSTOLIC BLOOD PRESSURE: 160 MMHG
OHS CV CPX PERCENT MAX PREDICTED HEART RATE ACHIEVED: 81
OHS CV CPX RATE PRESSURE PRODUCT PRESENTING: 9750
STRESS ECHO POST EXERCISE DUR MIN: 3 MINUTES
STRESS ECHO POST EXERCISE DUR SEC: 0 SECONDS
SYSTOLIC BLOOD PRESSURE: 130 MMHG

## 2021-11-22 PROCEDURE — 96372 THER/PROPH/DIAG INJ SC/IM: CPT | Mod: 59

## 2021-11-22 PROCEDURE — G0378 HOSPITAL OBSERVATION PER HR: HCPCS

## 2021-11-22 PROCEDURE — 25000003 PHARM REV CODE 250: Performed by: PHYSICIAN ASSISTANT

## 2021-11-22 PROCEDURE — A4216 STERILE WATER/SALINE, 10 ML: HCPCS | Performed by: PHYSICIAN ASSISTANT

## 2021-11-22 PROCEDURE — 25000003 PHARM REV CODE 250: Performed by: SPECIALIST

## 2021-11-22 PROCEDURE — S4991 NICOTINE PATCH NONLEGEND: HCPCS | Performed by: PHYSICIAN ASSISTANT

## 2021-11-22 PROCEDURE — 94761 N-INVAS EAR/PLS OXIMETRY MLT: CPT

## 2021-11-22 PROCEDURE — 99900035 HC TECH TIME PER 15 MIN (STAT)

## 2021-11-22 PROCEDURE — 63600175 PHARM REV CODE 636 W HCPCS: Performed by: HOSPITALIST

## 2021-11-22 RX ORDER — ASPIRIN 81 MG/1
81 TABLET ORAL DAILY
Qty: 30 TABLET | Refills: 0 | Status: SHIPPED | OUTPATIENT
Start: 2021-11-22 | End: 2022-11-22

## 2021-11-22 RX ORDER — EZETIMIBE 10 MG/1
10 TABLET ORAL NIGHTLY
Qty: 90 TABLET | Refills: 0 | Status: SHIPPED | OUTPATIENT
Start: 2021-11-22 | End: 2022-11-22

## 2021-11-22 RX ORDER — REGADENOSON 0.08 MG/ML
0.4 INJECTION, SOLUTION INTRAVENOUS ONCE
Status: COMPLETED | OUTPATIENT
Start: 2021-11-22 | End: 2021-11-22

## 2021-11-22 RX ORDER — ATORVASTATIN CALCIUM 40 MG/1
40 TABLET, FILM COATED ORAL DAILY
Qty: 90 TABLET | Refills: 0 | Status: SHIPPED | OUTPATIENT
Start: 2021-11-22 | End: 2022-11-22

## 2021-11-22 RX ADMIN — Medication 10 ML: at 05:11

## 2021-11-22 RX ADMIN — Medication 1 PATCH: at 10:11

## 2021-11-22 RX ADMIN — ENOXAPARIN SODIUM 120 MG: 120 INJECTION SUBCUTANEOUS at 05:11

## 2021-11-22 RX ADMIN — REGADENOSON 0.4 MG: 0.08 INJECTION, SOLUTION INTRAVENOUS at 09:11

## 2021-11-22 RX ADMIN — ASPIRIN 81 MG: 81 TABLET, COATED ORAL at 10:11

## 2021-11-22 RX ADMIN — ATORVASTATIN CALCIUM 40 MG: 40 TABLET, FILM COATED ORAL at 10:11

## 2021-11-23 ENCOUNTER — TELEPHONE (OUTPATIENT)
Dept: MEDSURG UNIT | Facility: HOSPITAL | Age: 46
End: 2021-11-23
Payer: MEDICAID

## 2023-04-18 ENCOUNTER — HOSPITAL ENCOUNTER (EMERGENCY)
Facility: HOSPITAL | Age: 48
Discharge: HOME | End: 2023-04-18
Attending: STUDENT IN AN ORGANIZED HEALTH CARE EDUCATION/TRAINING PROGRAM
Payer: MEDICAID

## 2023-04-18 VITALS
BODY MASS INDEX: 27.83 KG/M2 | HEIGHT: 73 IN | HEART RATE: 82 BPM | RESPIRATION RATE: 18 BRPM | TEMPERATURE: 98 F | OXYGEN SATURATION: 95 % | SYSTOLIC BLOOD PRESSURE: 150 MMHG | DIASTOLIC BLOOD PRESSURE: 91 MMHG | WEIGHT: 210 LBS

## 2023-04-18 DIAGNOSIS — M25.512 ACUTE PAIN OF LEFT SHOULDER: Primary | ICD-10-CM
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
## 2023-04-18 PROCEDURE — 99284 EMERGENCY DEPT VISIT MOD MDM: CPT

## 2023-04-18 PROCEDURE — 25000003 PHARM REV CODE 250: Performed by: STUDENT IN AN ORGANIZED HEALTH CARE EDUCATION/TRAINING PROGRAM

## 2023-04-18 RX ORDER — METHOCARBAMOL 500 MG/1
1000 TABLET, FILM COATED ORAL 3 TIMES DAILY
Qty: 30 TABLET | Refills: 0 | Status: SHIPPED | OUTPATIENT
Start: 2023-04-18 | End: 2023-04-23

## 2023-04-18 RX ORDER — IBUPROFEN 600 MG/1
600 TABLET ORAL
Status: COMPLETED | OUTPATIENT
Start: 2023-04-18 | End: 2023-04-18

## 2023-04-18 RX ORDER — METHOCARBAMOL 500 MG/1
1000 TABLET, FILM COATED ORAL
Status: COMPLETED | OUTPATIENT
Start: 2023-04-18 | End: 2023-04-18

## 2023-04-18 RX ORDER — LIDOCAINE 50 MG/G
1 PATCH TOPICAL DAILY
Qty: 30 PATCH | Refills: 0 | Status: SHIPPED | OUTPATIENT
Start: 2023-04-18

## 2023-04-18 RX ORDER — NAPROXEN 500 MG/1
500 TABLET ORAL 2 TIMES DAILY WITH MEALS
Qty: 20 TABLET | Refills: 0 | Status: SHIPPED | OUTPATIENT
Start: 2023-04-18 | End: 2023-04-28

## 2023-04-18 RX ORDER — ACETAMINOPHEN 500 MG
1000 TABLET ORAL
Status: COMPLETED | OUTPATIENT
Start: 2023-04-18 | End: 2023-04-18

## 2023-04-18 RX ADMIN — ACETAMINOPHEN 1000 MG: 500 TABLET ORAL at 10:04

## 2023-04-18 RX ADMIN — IBUPROFEN 600 MG: 600 TABLET ORAL at 10:04

## 2023-04-18 RX ADMIN — METHOCARBAMOL 1000 MG: 500 TABLET ORAL at 10:04

## 2023-04-18 NOTE — FIRST PROVIDER EVALUATION
Emergency Department TeleTriage Encounter Note      CHIEF COMPLAINT    Chief Complaint   Patient presents with    Fall     Patient fell 3 days ago and heard a pop in his left shoulder was hoping the pain would get better but instead it has gotten worse, states he now has numbness in his left hands and fingers        VITAL SIGNS   Initial Vitals [04/18/23 1806]   BP Pulse Resp Temp SpO2   129/82 106 20 98.2 °F (36.8 °C) 97 %      MAP       --            ALLERGIES    Review of patient's allergies indicates:  No Known Allergies    PROVIDER TRIAGE NOTE  Patient presents with complaint of left shoulder pain after a fall.       Phy:   Constitutional: well nourished, well developed, appearing stated age, NAD   HEENT: NCAT, symmetrical lids, No obvious facial deformity.  Normal phonation. Normal Conjunctiva   Neck: NAROM   Respiratory: Normal effort.  No obvious use of accessory muscles   Musculoskeletal: Moved upper extremities well   Neuro: Alert, answers questions appropriately    Psych: appropriate mood and affect      Initial orders will be placed and care will be transferred to an alternate provider when patient is roomed for a full evaluation. Any additional orders and the final disposition will be determined by that provider.        ORDERS  Labs Reviewed   HIV 1 / 2 ANTIBODY   HEPATITIS C ANTIBODY       ED Orders (720h ago, onward)      Start Ordered     Status Ordering Provider    04/18/23 1807 04/18/23 1806  HIV 1/2 Ag/Ab (4th Gen)  STAT         Pending Collection ANGELITO FLOWERS    04/18/23 1807 04/18/23 1806  Hepatitis C Antibody  STAT         Pending Collection ANGELITO FLOWERS              Virtual Visit Note: The provider triage portion of this emergency department evaluation and documentation was performed via [employer], a HIPAA-compliant telemedicine application, in concert with a tele-presenter in the room. A face to face patient evaluation with one of my colleagues will occur once the patient is  placed in an emergency department room.      DISCLAIMER: This note was prepared with [employer] voice recognition transcription software. Garbled syntax, mangled pronouns, and other bizarre constructions may be attributed to that software system.

## 2023-04-19 NOTE — DISCHARGE INSTRUCTIONS
Please follow-up with orthopedics.  Follow-up with your primary doctor soon as you can for re-evaluation.  Take medication as prescribed.  Use a sling as needed for comfort but make sure taking your shoulder out frequently to exercise it.

## 2023-04-19 NOTE — ED NOTES
Assumed care:  Tomy Hylton is awake, alert and oriented x 3, skin warm and dry, in NAD.  Patient states that he fell 2 days ago and felt a pop in his left shoulder.  States since then, he has had pain with any type of movement and some numbness and tingling to left hand.     Patient identifiers for Tomy Hylton checked and correct.  LOC:  Tomy Hylton is awake, alert, and aware of environment with an appropriate affect. He is oriented x 3 and speaking appropriately.  [place]  APPEARANCE:  He is resting comfortably and in no acute distress. He is clean and well groomed, patient's clothing is properly fastened.  SKIN:  The skin is warm and dry. He has normal skin turgor and moist mucus membranes. Skin is intact; no bruising or breakdown noted.  MUSCULOSKELETAL:  He is moving all extremities well, no obvious deformities noted. Pulses intact.  Left shoulder pain  RESPIRATORY:  Airway is open and patent. Respirations are spontaneous and non-labored with normal effort and rate.  CARDIAC:  He has a normal rate and rhythm. No peripheral edema noted. Capillary refill < 3 seconds.  ABDOMEN:  No distention noted.  Soft and non-tender upon palpation.  NEUROLOGICAL:  PERRL. Facial expression is symmetrical. Hand grasps are equal bilaterally. Normal sensation in all extremities when touched with finger.  Allergies reported:  Review of patient's allergies indicates:  No Known Allergies

## 2023-04-19 NOTE — ED NOTES
Upon discharge, patient is AAOx4, no cardiac or respiratory complications. Follow up care reviewed with patient and has been instructed to return to the ER if needed. Patient verbalized understanding and ambulated to the lobby without difficulty. XENA SMART